# Patient Record
Sex: MALE | Race: BLACK OR AFRICAN AMERICAN
[De-identification: names, ages, dates, MRNs, and addresses within clinical notes are randomized per-mention and may not be internally consistent; named-entity substitution may affect disease eponyms.]

---

## 2020-09-26 ENCOUNTER — HOSPITAL ENCOUNTER (EMERGENCY)
Dept: HOSPITAL 76 - ED | Age: 25
Discharge: HOME | End: 2020-09-26
Payer: COMMERCIAL

## 2020-09-26 VITALS — DIASTOLIC BLOOD PRESSURE: 93 MMHG | SYSTOLIC BLOOD PRESSURE: 130 MMHG

## 2020-09-26 DIAGNOSIS — R50.83: Primary | ICD-10-CM

## 2020-09-26 DIAGNOSIS — T50.Z95A: ICD-10-CM

## 2020-09-26 DIAGNOSIS — R51: ICD-10-CM

## 2020-09-26 DIAGNOSIS — Z20.828: ICD-10-CM

## 2020-09-26 PROCEDURE — 99283 EMERGENCY DEPT VISIT LOW MDM: CPT

## 2020-09-26 PROCEDURE — 87635 SARS-COV-2 COVID-19 AMP PRB: CPT

## 2020-09-26 PROCEDURE — 99282 EMERGENCY DEPT VISIT SF MDM: CPT

## 2020-09-26 NOTE — ED PHYSICIAN DOCUMENTATION
History of Present Illness





- Stated complaint


Stated Complaint: FEVER/HA





- Chief complaint


Chief Complaint: Fever





- History obtained from


History obtained from: Patient





- History of Present Illness


Timing: Last night





- Additonal information


Additional information: 





Previously well 24-year-old [employer] male personnel had an HPV 

vaccination yesterday at his annual physical.  He states that he has had lots of

vaccinations previously is never had any reactions to them.  He has not had HPV 

vaccination previously last night he developed a fever and headache and this is 

somewhat better this morning but he was unable to complete his watch last night.

 He states that he is otherwise not feeling ill and has no other specific 

symptoms.  He denies any rash or shortness of breath.





Review of Systems


Constitutional: reports: Fever, Chills, Myalgias, Fatigue


Eyes: denies: Decreased vision


Ears: denies: Ear pain


Nose: denies: Rhinorrhea / runny nose, Congestion


Throat: denies: Sore throat


Cardiac: denies: Chest pain / pressure, Palpitations


Respiratory: denies: Dyspnea, Cough


GI: denies: Abdominal Pain, Nausea, Vomiting


: denies: Dysuria, Frequency


Skin: denies: Rash


Musculoskeletal: denies: Neck pain, Back pain, Extremity pain


Neurologic: denies: Generalized weakness, Focal weakness, Numbness





PD PAST MEDICAL HISTORY





- Past Medical History


Cardiovascular: None


Neuro: None


Endocrine/Autoimmune: None


GI: None


: None


HEENT: None


Psych: None


Musculoskeletal: None


Derm: None





- Past Surgical History


Past Surgical History: Yes


Ortho: Other





- Present Medications


Home Medications: 


                                Ambulatory Orders











 Medication  Instructions  Recorded  Confirmed


 


No Known Home Medications  09/26/20 09/26/20














- Allergies


Allergies/Adverse Reactions: 


                                    Allergies











Allergy/AdvReac Type Severity Reaction Status Date / Time


 


No Known Drug Allergies Allergy   Verified 09/26/20 08:51














- Social History


Does the pt smoke?: No


Smoking Status: Never smoker


Does the pt drink ETOH?: Yes


ETOH Use: Liquor


Does the pt have substance abuse?: No





- Immunizations


Immunizations are current?: Yes





- POLST


Patient has POLST: No





PD ED PE NORMAL





- Vitals


Vital signs reviewed: Yes (hypertensive )





- General


General: Alert and oriented X 3, No acute distress, Well developed/nourished, 

Other (broad smile with mask below the chin)





- HEENT


HEENT: Atraumatic, PERRL, EOMI, Ears normal, Moist mucous membranes, Pharynx 

benign, Dentition benign





- Neck


Neck: Supple, no meningeal sign, No bony TTP





- Cardiac


Cardiac: RRR, No murmur





- Respiratory


Respiratory: No respiratory distress, Clear bilaterally





- Abdomen


Abdomen: Soft, Non tender





- Back


Back: No CVA TTP, No spinal TTP





- Derm


Derm: Normal color, Warm and dry, No rash





- Extremities


Extremities: No deformity, No edema





- Neuro


Neuro: Alert and oriented X 3, CNs 2-12 intact, No motor deficit, No sensory 

deficit, Normal speech


Eye Opening: Spontaneous


Motor: Obeys Commands


Verbal: Oriented


GCS Score: 15





- Psych


Psych: Normal mood, Normal affect





Results





- Vitals


Vitals: 


                               Vital Signs - 24 hr











  09/26/20 09/26/20 09/26/20





  08:51 09:07 10:06


 


Temperature 38 C H 37.5 C 37.4 C


 


Heart Rate 81 78 76


 


Respiratory 16 16 16





Rate   


 


Blood Pressure 151/84 H 134/86 H 130/93 H


 


O2 Saturation 97 98 98








                                     Oxygen











O2 Source                      Room air

















PD MEDICAL DECISION MAKING





- ED course


Complexity details: re-evaluated patient, considered differential, d/w patient


ED course: 





24-year-old male with a fever after a immunization is administered Tylenol he 

does not otherwise a pill appear ill.  This does not appear to be an allergic 

reaction.  The patient's fever defervesced is in the emergency department prior 

to treatment. 





Departure





- Departure


Disposition: 01 Home, Self Care


Clinical Impression: 


Vaccination reaction


Qualifiers:


 Encounter type: initial encounter Qualified Code(s): T50.Z95A - Adverse effect 

of other vaccines and biological substances, initial encounter





Condition: Stable


Instructions:  Vaccine Specific Info


Follow-Up: 


[facility] [place] [Provider Group]


Comments: 


Today it appears you have a febrile reaction to the HPV vaccination.  The 

recommendation is to take Tylenol today and extra fluids.  Return for shortness 

of breath, development of rash or new or concerning symptoms.


Forms:  Activity restrictions

## 2023-03-02 ENCOUNTER — HOSPITAL ENCOUNTER (EMERGENCY)
Dept: HOSPITAL 76 - ED | Age: 28
Discharge: HOME | End: 2023-03-02
Payer: COMMERCIAL

## 2023-03-02 VITALS — DIASTOLIC BLOOD PRESSURE: 81 MMHG | SYSTOLIC BLOOD PRESSURE: 151 MMHG

## 2023-03-02 DIAGNOSIS — L02.31: Primary | ICD-10-CM

## 2023-03-02 PROCEDURE — 99282 EMERGENCY DEPT VISIT SF MDM: CPT

## 2023-03-02 PROCEDURE — 10060 I&D ABSCESS SIMPLE/SINGLE: CPT

## 2023-03-02 NOTE — ED PHYSICIAN DOCUMENTATION
History of Present Illness





- Stated complaint


Stated Complaint: LT UPPER LEG LUMP





- Chief complaint


Chief Complaint: Wound





- Additonal information


Additional information: 





27-year-old male.  Patient is good historian.  Referred to the ER from [facility] for concern of large right buttock abscess.  Symptoms began about 3 days

ago.  Pain with sitting.  Has begun to have some fluctuance from the lesion.  

Reports defecating normally without blood mucus or purulence within the fecal 

material.  No history of similar.  Immunizations and tetanus are up-to-date.





Review of Systems


Constitutional: reports: Reviewed and negative


Skin: reports: Lesions





PD PAST MEDICAL HISTORY





- Past Medical History


Past Medical History: No


Cardiovascular: None


Neuro: None


Endocrine/Autoimmune: None


GI: None


: None


HEENT: None


Psych: None


Musculoskeletal: None


Derm: None





- Past Surgical History


Past Surgical History: Yes


Ortho: Other





- Present Medications


Home Medications: 


                                Ambulatory Orders











 Medication  Instructions  Recorded  Confirmed


 


HYDROcod/ACETAM 5/325 [Norco 5/325] 1 tablet PO BID PRN #5 tablet 03/02/23 


 


Sulfamethox/Trimeth 800/160 1 each PO BID #14 tablet 03/02/23 





[Bactrim Ds 800/160]   














- Allergies


Allergies/Adverse Reactions: 


                                    Allergies











Allergy/AdvReac Type Severity Reaction Status Date / Time


 


No Known Drug Allergies Allergy   Verified 03/02/23 11:50














- Social History


Does the pt smoke?: No


Smoking Status: Never smoker


Does the pt drink ETOH?: Yes


Does the pt have substance abuse?: No





- Immunizations


Immunizations are current?: Yes





- POLST


Patient has POLST: No





PD ED PE EXPANDED





- General


General: Alert, No acute distress





- Rectal


Rectal: Chaperone present, Other (Large 3 x 4 abscess in the right buttock with 

centralized fluctuance and purulent drainage.  No extension to the perirectal or

anal region.)





Results





- Vitals


Vitals: 


                               Vital Signs - 24 hr











  03/02/23





  11:47


 


Temperature 36.3 C L


 


Heart Rate 76


 


Respiratory 16





Rate 


 


Blood Pressure 151/81 H


 


O2 Saturation 100








                                     Oxygen











O2 Source                      Room air

















Procedures





- Abscess I&D (location)


  ** Right buttock


Preparation: Betadine


Incision: Incised with scalpel, Purulent drainage, Loculations broken, 

Irrigated, Packed


Other: Pt tolerated well, Antibiotic prescribed





PD Medical Decision Making





- ED course


Complexity details: considered differential, d/w patient


ED course: 





27-year-old male presents with a large buttock on his right abscess that began 

about 3 days ago.  This abscess is within the buttock region and does not extend

into the perirectal or perianal region.  The abscess was incised and drained at 

the bedside.  Large amount of purulent fluid drained.  He will be placed on 

Bactrim.  Packing was placed and I have advised to be removed in 36 to 48 hours.

 We discussed the usual routine wound care as well as emergent return 

precautions.





I am prescribing a short course of short-acting opioid pain medication for this 

patient. I have reviewed the patients  and no concerning findings were 

noted. I have discussed that the opioids are for short term therapy only, and 

will not be refilled from the ED.





Departure





- Departure


Disposition: 01 Home, Self Care


Clinical Impression: 


 Abscess of buttock, right





Condition: Stable


Record reviewed to determine appropriate education?: Yes


Prescriptions: 


Sulfamethox/Trimeth 800/160 [Bactrim Ds 800/160] 1 each PO BID #14 tablet


HYDROcod/ACETAM 5/325 [Norco 5/325] 1 tablet PO BID PRN #5 tablet


 PRN Reason: Pain


Comments: 


Gurpreet pressley did have a large Abscess here in her buttock.  We did do an incision 

and drainage at the bedside.  This however did appear fairly infected and I sent

a prescription for Bactrim, an antibiotic, to the [facility] in [place].  You 

will take it twice daily for the next week.





A small amount of packing was placed in your wound.  I would like this to remain

in place for the next 36 to 48 hours.  [facility] should be able to remove 

it.  If it falls out before then it is okay.  I do recommend that you take warm 

sitz bath's twice daily.  This will allow warm water to rinse through the wound 

and aid in healing.  Alternatively you can allow warm shower water to rinse 

through the wound.





I would expect that this takes a few weeks to heal.  But with the antibiotics 

you should have improved pain, redness and swelling over the next 48 to 72 

hours.  If not improving or worsening please return immediately to the ER.





I am prescribing a short course of narcotic pain medication for you. These are 

potentially dangerous and addictive medications that should be used carefully.


These medications may constipate you. Take an over-the-counter stool softener 

(docusate) twice daily with plenty of water while taking these medications. If 

you go 24 hours without a bowel movement, take over-the-counter miralax, per 

package instructions.


Do not drink or drive while taking these medications.


If you received narcotic or sedating medications while in the emergency 

department, do not drive for 24 hours.


Store this medication in a safe, secure place and out of reach of children.


It is a violation of federal law to give or sell this medication to another 

person or to use in a manner other than prescribed.


The ED will not refill narcotic prescriptions, including prescriptions lost or 

stolen.


To dispose of unwanted medications:


1. [facility] [address] in 

[place] has a medication drop box. They accept prescription medications (in 

pill form) Monday through Friday 9:00 a.m. to 5:00 p.m.


2. [facility] [employer] accepts prescription medications (in

pill form only) for disposal year round. Call [phone] for more 

information.


3. Contact the [facility] for the next [facility] sponsored prescription 

drug collection event. [phone], [phone], or [phone] 

[phone];


Note that many narcotic pain relievers also contain Tylenol/acetaminophen.  

Please ensure that your total dose of acetaminophen from all sources does not 

exceed 3 g (3000 mg) per day.